# Patient Record
Sex: FEMALE | Race: BLACK OR AFRICAN AMERICAN | URBAN - NONMETROPOLITAN AREA
[De-identification: names, ages, dates, MRNs, and addresses within clinical notes are randomized per-mention and may not be internally consistent; named-entity substitution may affect disease eponyms.]

---

## 2022-08-12 LAB
CREATININE, EXTERNAL: 1
HBA1C MFR BLD HPLC: 5.5 %

## 2023-06-29 ENCOUNTER — OFFICE VISIT (OUTPATIENT)
Facility: CLINIC | Age: 73
End: 2023-06-29
Payer: MEDICARE

## 2023-06-29 ENCOUNTER — HOSPITAL ENCOUNTER (OUTPATIENT)
Age: 73
Setting detail: SPECIMEN
Discharge: HOME | End: 2023-06-29
Payer: MEDICARE

## 2023-06-29 VITALS
TEMPERATURE: 97.4 F | RESPIRATION RATE: 15 BRPM | DIASTOLIC BLOOD PRESSURE: 69 MMHG | HEART RATE: 59 BPM | OXYGEN SATURATION: 94 % | SYSTOLIC BLOOD PRESSURE: 133 MMHG | WEIGHT: 167 LBS

## 2023-06-29 DIAGNOSIS — I82.4Y3 ACUTE DEEP VEIN THROMBOSIS (DVT) OF PROXIMAL VEIN OF BOTH LOWER EXTREMITIES (HCC): ICD-10-CM

## 2023-06-29 DIAGNOSIS — I10 PRIMARY HYPERTENSION: ICD-10-CM

## 2023-06-29 DIAGNOSIS — I10 PRIMARY HYPERTENSION: Primary | ICD-10-CM

## 2023-06-29 DIAGNOSIS — E89.0 POSTOPERATIVE HYPOTHYROIDISM: ICD-10-CM

## 2023-06-29 DIAGNOSIS — E78.00 HIGH CHOLESTEROL: ICD-10-CM

## 2023-06-29 LAB
ALBUMIN SERPL-MCNC: 4 G/DL (ref 3.4–5)
ALBUMIN/GLOB SERPL: 1.3 (ref 0.8–1.7)
ALP SERPL-CCNC: 126 U/L (ref 45–117)
ALT SERPL-CCNC: 32 U/L (ref 13–56)
ANION GAP SERPL CALC-SCNC: 3 MMOL/L (ref 3–18)
AST SERPL W P-5'-P-CCNC: 20 U/L (ref 10–38)
BASOPHILS # BLD: 0.1 K/UL (ref 0–0.1)
BASOPHILS NFR BLD: 1 % (ref 0–2)
BILIRUB SERPL-MCNC: 0.6 MG/DL (ref 0.2–1)
BUN SERPL-MCNC: 19 MG/DL (ref 7–18)
BUN/CREAT SERPL: 20 (ref 12–20)
CA-I BLD-MCNC: 10.1 MG/DL (ref 8.5–10.1)
CHLORIDE SERPL-SCNC: 107 MMOL/L (ref 100–111)
CO2 SERPL-SCNC: 32 MMOL/L (ref 21–32)
CREAT SERPL-MCNC: 0.95 MG/DL (ref 0.6–1.3)
DIFFERENTIAL METHOD BLD: ABNORMAL
EOSINOPHIL # BLD: 0.1 K/UL (ref 0–0.4)
EOSINOPHIL NFR BLD: 2 % (ref 0–5)
ERYTHROCYTE [DISTWIDTH] IN BLOOD BY AUTOMATED COUNT: 12.9 % (ref 11.6–14.5)
GLOBULIN SER CALC-MCNC: 3.2 G/DL (ref 2–4)
GLUCOSE SERPL-MCNC: 88 MG/DL (ref 74–99)
HCT VFR BLD AUTO: 40.8 % (ref 35–45)
HGB BLD-MCNC: 13.1 G/DL (ref 12–16)
IMM GRANULOCYTES # BLD AUTO: 0 K/UL (ref 0–0.04)
IMM GRANULOCYTES NFR BLD AUTO: 0 % (ref 0–0.5)
LYMPHOCYTES # BLD: 1.8 K/UL (ref 0.9–3.6)
LYMPHOCYTES NFR BLD: 33 % (ref 21–52)
MCH RBC QN AUTO: 30.5 PG (ref 24–34)
MCHC RBC AUTO-ENTMCNC: 32.1 G/DL (ref 31–37)
MCV RBC AUTO: 95.1 FL (ref 78–100)
MONOCYTES # BLD: 0.5 K/UL (ref 0.05–1.2)
MONOCYTES NFR BLD: 9 % (ref 3–10)
NEUTS SEG # BLD: 2.9 K/UL (ref 1.8–8)
NEUTS SEG NFR BLD: 55 % (ref 40–73)
NRBC # BLD: 0 K/UL (ref 0–0.01)
NRBC BLD-RTO: 0 PER 100 WBC
PLATELET # BLD AUTO: 225 K/UL (ref 135–420)
PLATELET COMMENT: ABNORMAL
PMV BLD AUTO: 13.4 FL (ref 9.2–11.8)
POTASSIUM SERPL-SCNC: 4.1 MMOL/L (ref 3.5–5.5)
PROT SERPL-MCNC: 7.2 G/DL (ref 6.4–8.2)
RBC # BLD AUTO: 4.29 M/UL (ref 4.2–5.3)
RBC MORPH BLD: ABNORMAL
SODIUM SERPL-SCNC: 142 MMOL/L (ref 136–145)
TSH SERPL DL<=0.05 MIU/L-ACNC: 2.17 UIU/ML (ref 0.36–3.74)
WBC # BLD AUTO: 5.4 K/UL (ref 4.6–13.2)

## 2023-06-29 PROCEDURE — G8400 PT W/DXA NO RESULTS DOC: HCPCS | Performed by: NURSE PRACTITIONER

## 2023-06-29 PROCEDURE — 85025 COMPLETE CBC W/AUTO DIFF WBC: CPT

## 2023-06-29 PROCEDURE — G8421 BMI NOT CALCULATED: HCPCS | Performed by: NURSE PRACTITIONER

## 2023-06-29 PROCEDURE — G8428 CUR MEDS NOT DOCUMENT: HCPCS | Performed by: NURSE PRACTITIONER

## 2023-06-29 PROCEDURE — 3078F DIAST BP <80 MM HG: CPT | Performed by: NURSE PRACTITIONER

## 2023-06-29 PROCEDURE — 1036F TOBACCO NON-USER: CPT | Performed by: NURSE PRACTITIONER

## 2023-06-29 PROCEDURE — 3017F COLORECTAL CA SCREEN DOC REV: CPT | Performed by: NURSE PRACTITIONER

## 2023-06-29 PROCEDURE — 1090F PRES/ABSN URINE INCON ASSESS: CPT | Performed by: NURSE PRACTITIONER

## 2023-06-29 PROCEDURE — 1123F ACP DISCUSS/DSCN MKR DOCD: CPT | Performed by: NURSE PRACTITIONER

## 2023-06-29 PROCEDURE — 3075F SYST BP GE 130 - 139MM HG: CPT | Performed by: NURSE PRACTITIONER

## 2023-06-29 PROCEDURE — 80061 LIPID PANEL: CPT

## 2023-06-29 PROCEDURE — 84443 ASSAY THYROID STIM HORMONE: CPT

## 2023-06-29 PROCEDURE — 80053 COMPREHEN METABOLIC PANEL: CPT

## 2023-06-29 PROCEDURE — 99203 OFFICE O/P NEW LOW 30 MIN: CPT | Performed by: NURSE PRACTITIONER

## 2023-06-29 RX ORDER — LOSARTAN POTASSIUM 100 MG/1
100 TABLET ORAL DAILY
COMMUNITY

## 2023-06-29 RX ORDER — SIMVASTATIN 20 MG
20 TABLET ORAL NIGHTLY
COMMUNITY

## 2023-06-29 RX ORDER — AMLODIPINE BESYLATE 5 MG/1
5 TABLET ORAL DAILY
COMMUNITY

## 2023-06-29 RX ORDER — HYDROCHLOROTHIAZIDE 12.5 MG/1
CAPSULE, GELATIN COATED ORAL DAILY
COMMUNITY
Start: 2023-06-13

## 2023-06-29 RX ORDER — LEVOTHYROXINE SODIUM 88 UG/1
88 TABLET ORAL DAILY
COMMUNITY

## 2023-06-29 SDOH — ECONOMIC STABILITY: INCOME INSECURITY: HOW HARD IS IT FOR YOU TO PAY FOR THE VERY BASICS LIKE FOOD, HOUSING, MEDICAL CARE, AND HEATING?: NOT HARD AT ALL

## 2023-06-29 SDOH — ECONOMIC STABILITY: FOOD INSECURITY: WITHIN THE PAST 12 MONTHS, THE FOOD YOU BOUGHT JUST DIDN'T LAST AND YOU DIDN'T HAVE MONEY TO GET MORE.: NEVER TRUE

## 2023-06-29 SDOH — ECONOMIC STABILITY: HOUSING INSECURITY
IN THE LAST 12 MONTHS, WAS THERE A TIME WHEN YOU DID NOT HAVE A STEADY PLACE TO SLEEP OR SLEPT IN A SHELTER (INCLUDING NOW)?: NO

## 2023-06-29 SDOH — ECONOMIC STABILITY: FOOD INSECURITY: WITHIN THE PAST 12 MONTHS, YOU WORRIED THAT YOUR FOOD WOULD RUN OUT BEFORE YOU GOT MONEY TO BUY MORE.: NEVER TRUE

## 2023-06-29 ASSESSMENT — PATIENT HEALTH QUESTIONNAIRE - PHQ9
SUM OF ALL RESPONSES TO PHQ QUESTIONS 1-9: 0
1. LITTLE INTEREST OR PLEASURE IN DOING THINGS: 0
SUM OF ALL RESPONSES TO PHQ9 QUESTIONS 1 & 2: 0
SUM OF ALL RESPONSES TO PHQ QUESTIONS 1-9: 0
2. FEELING DOWN, DEPRESSED OR HOPELESS: 0
SUM OF ALL RESPONSES TO PHQ QUESTIONS 1-9: 0
SUM OF ALL RESPONSES TO PHQ QUESTIONS 1-9: 0

## 2023-06-30 LAB
CHOLEST SERPL-MCNC: 216 MG/DL
HDLC SERPL-MCNC: 79 MG/DL (ref 40–60)
HDLC SERPL: 2.7 (ref 0–5)
LDLC SERPL CALC-MCNC: 120.8 MG/DL (ref 0–100)
LIPID PANEL: ABNORMAL
TRIGL SERPL-MCNC: 81 MG/DL
VLDLC SERPL CALC-MCNC: 16.2 MG/DL

## 2023-07-03 NOTE — RESULT ENCOUNTER NOTE
Please let the patient know lab work is stable except cholesterol and LDL cholesterol are elevated. She and I can further discuss this in detail telephone virtual visit or in office whichever she prefers.

## 2023-07-18 ENCOUNTER — OFFICE VISIT (OUTPATIENT)
Facility: CLINIC | Age: 73
End: 2023-07-18
Payer: MEDICARE

## 2023-07-18 VITALS
RESPIRATION RATE: 18 BRPM | SYSTOLIC BLOOD PRESSURE: 121 MMHG | HEIGHT: 62 IN | TEMPERATURE: 97.6 F | DIASTOLIC BLOOD PRESSURE: 66 MMHG | WEIGHT: 169 LBS | BODY MASS INDEX: 31.1 KG/M2 | OXYGEN SATURATION: 98 % | HEART RATE: 55 BPM

## 2023-07-18 DIAGNOSIS — Z78.0 POST-MENOPAUSAL: ICD-10-CM

## 2023-07-18 DIAGNOSIS — E78.00 HIGH CHOLESTEROL: Primary | ICD-10-CM

## 2023-07-18 DIAGNOSIS — Z12.31 ENCOUNTER FOR SCREENING MAMMOGRAM FOR MALIGNANT NEOPLASM OF BREAST: ICD-10-CM

## 2023-07-18 PROCEDURE — G8417 CALC BMI ABV UP PARAM F/U: HCPCS | Performed by: NURSE PRACTITIONER

## 2023-07-18 PROCEDURE — 1123F ACP DISCUSS/DSCN MKR DOCD: CPT | Performed by: NURSE PRACTITIONER

## 2023-07-18 PROCEDURE — 99213 OFFICE O/P EST LOW 20 MIN: CPT | Performed by: NURSE PRACTITIONER

## 2023-07-18 PROCEDURE — 1036F TOBACCO NON-USER: CPT | Performed by: NURSE PRACTITIONER

## 2023-07-18 PROCEDURE — G8427 DOCREV CUR MEDS BY ELIG CLIN: HCPCS | Performed by: NURSE PRACTITIONER

## 2023-07-18 PROCEDURE — 1090F PRES/ABSN URINE INCON ASSESS: CPT | Performed by: NURSE PRACTITIONER

## 2023-07-18 PROCEDURE — 3017F COLORECTAL CA SCREEN DOC REV: CPT | Performed by: NURSE PRACTITIONER

## 2023-07-18 PROCEDURE — G8400 PT W/DXA NO RESULTS DOC: HCPCS | Performed by: NURSE PRACTITIONER

## 2023-07-18 ASSESSMENT — PATIENT HEALTH QUESTIONNAIRE - PHQ9
SUM OF ALL RESPONSES TO PHQ QUESTIONS 1-9: 0
SUM OF ALL RESPONSES TO PHQ9 QUESTIONS 1 & 2: 0
2. FEELING DOWN, DEPRESSED OR HOPELESS: 0
1. LITTLE INTEREST OR PLEASURE IN DOING THINGS: 0
SUM OF ALL RESPONSES TO PHQ QUESTIONS 1-9: 0

## 2023-07-18 NOTE — PROGRESS NOTES
Chief Complaint   Patient presents with    Follow-up     Discuss results   BELA signed for bone density results    1. \"Have you been to the ER, urgent care clinic since your last visit? Hospitalized since your last visit? \" No    2. \"Have you seen or consulted any other health care providers outside of the [address] [place] [address] since your last visit? \" No     3. For patients aged 43-73: Has the patient had a colonoscopy / FIT/ Cologuard? No      If the patient is female:    4. For patients aged 43-66: Has the patient had a mammogram within the past 2 years? No      5. For patients aged 21-65: Has the patient had a pap smear?  NA - based on age or sex

## 2023-07-18 NOTE — PROGRESS NOTES
Kristina Best (: 1950) is a 67 y.o. female patient, here for evaluation of the following chief complaint(s):  Follow-up (Discuss results)       ASSESSMENT/PLAN:  Below is the assessment and plan developed based on review of pertinent history, physical exam, labs, studies, and medications. Patient will continue Zocor patient continues to see primary care in [place] I recommend she share the blood work that was done primary. Patient does states she is due for mammogram and this is ordered. Patient should follow-up for any new worsening or continued symptoms    1. High cholesterol  2. Encounter for screening mammogram for malignant neoplasm of breast  -     MAR[facility] DARA DIGITAL SCREEN BILATERAL; Future  3. Post-menopausal    No results found for any visits on 23. No follow-ups on file. I have discussed the diagnosis with the patient and the intended plan as seen in the above orders. Questions were answered concerning future plans. I have discussed medication side effects and warnings with the patient as well. I have reviewed the plan of care with the patient, accepted their input and they are in agreement with the treatment goals. Previous lab and imaging results were reviewed by me. 20 mins spent and  performed a medically appropriate exam, ordering tests and medications, counseling and educating, and documenting in the EMR                       SUBJECTIVE/OBJECTIVE:    HPI: 50-year-old female presents to the office to review her blood work. Noted that patient has mildly elevated total and LDL cholesterol within also positive elevated HDL cholesterol patient does take Zocor as prescribed. The patient does continue to see specialty providers in [place] where she recently moved from. The patient saw hematology  and is being assessed for her blood clots.   Patient has an appointment with her primary care provider in [place]  No reports of fever , chills , sweats , chest pain,

## 2023-07-26 DIAGNOSIS — Z12.11 SCREEN FOR COLON CANCER: ICD-10-CM

## 2023-07-26 DIAGNOSIS — Z12.11 SCREEN FOR COLON CANCER: Primary | ICD-10-CM

## 2023-08-23 ENCOUNTER — TELEPHONE (OUTPATIENT)
Facility: CLINIC | Age: 73
End: 2023-08-23

## 2023-08-23 NOTE — TELEPHONE ENCOUNTER
Pt called and stated that timothy went for her mammogram and they called her today and stated that she needed an order to complete another one. I believe that they are requesting a diagnostic mammogram, as they stated they saw something.

## 2023-08-25 DIAGNOSIS — R92.8 ABNORMAL SCREENING MAMMOGRAM: Primary | ICD-10-CM

## 2023-08-25 NOTE — TELEPHONE ENCOUNTER
Spoke to pt and let her know info on diagnostic mammogram and US ordered and where to call to schedule.

## 2023-08-25 NOTE — PROGRESS NOTES
The patient is requesting that I order her a follow-up mammogram, I do have the findings of the mammogram reading screening mammogram,  That was read on August 21, 2023. The impression is an increase in size of the left breast mass it is a circumscribed left breast mass that measures up to 6.5 cm\\, previously at 4.5 centimeters in 2021  It is present at approximately 7 o'clock position at anterior depth.